# Patient Record
Sex: MALE | Race: WHITE | NOT HISPANIC OR LATINO | Employment: OTHER | URBAN - METROPOLITAN AREA
[De-identification: names, ages, dates, MRNs, and addresses within clinical notes are randomized per-mention and may not be internally consistent; named-entity substitution may affect disease eponyms.]

---

## 2018-02-07 ENCOUNTER — HISTORICAL (OUTPATIENT)
Dept: LAB | Facility: HOSPITAL | Age: 63
End: 2018-02-07

## 2018-02-07 LAB — RHEUMATOID FACT SERPL-ACNC: 10 IU/ML (ref 0–15)

## 2018-04-18 ENCOUNTER — HISTORICAL (OUTPATIENT)
Dept: ADMINISTRATIVE | Facility: HOSPITAL | Age: 63
End: 2018-04-18

## 2018-10-10 ENCOUNTER — HISTORICAL (OUTPATIENT)
Dept: ADMINISTRATIVE | Facility: HOSPITAL | Age: 63
End: 2018-10-10

## 2018-10-10 LAB
ABS NEUT (OLG): 7
ALBUMIN SERPL-MCNC: 4.6 GM/DL (ref 3.4–5)
ALBUMIN/GLOB SERPL: 2.19 {RATIO} (ref 1.5–2.5)
ALP SERPL-CCNC: 46 UNIT/L (ref 38–126)
ALT SERPL-CCNC: 23 UNIT/L (ref 7–52)
AST SERPL-CCNC: 17 UNIT/L (ref 15–37)
BILIRUB SERPL-MCNC: 0.6 MG/DL (ref 0.2–1)
BILIRUBIN DIRECT+TOT PNL SERPL-MCNC: 0.1 MG/DL (ref 0–0.5)
BILIRUBIN DIRECT+TOT PNL SERPL-MCNC: 0.5 MG/DL
BUN SERPL-MCNC: 20 MG/DL (ref 7–18)
CALCIUM SERPL-MCNC: 9.6 MG/DL (ref 8.5–10)
CHLORIDE SERPL-SCNC: 103 MMOL/L (ref 98–107)
CHOLEST SERPL-MCNC: 175 MG/DL (ref 0–200)
CHOLEST/HDLC SERPL: 4.3 {RATIO}
CO2 SERPL-SCNC: 27 MMOL/L (ref 21–32)
CREAT SERPL-MCNC: 1.08 MG/DL (ref 0.6–1.3)
ERYTHROCYTE [DISTWIDTH] IN BLOOD BY AUTOMATED COUNT: 13.5 % (ref 11.5–17)
GLOBULIN SER-MCNC: 2.1 GM/DL (ref 1.2–3)
GLUCOSE SERPL-MCNC: 116 MG/DL (ref 74–106)
HCT VFR BLD AUTO: 47 % (ref 42–52)
HDLC SERPL-MCNC: 41 MG/DL (ref 35–60)
HGB BLD-MCNC: 15.4 GM/DL (ref 14–18)
LDLC SERPL CALC-MCNC: 107 MG/DL (ref 0–129)
LYMPHOCYTES # BLD AUTO: 2.4 X10(3)/MCL (ref 0.6–3.4)
LYMPHOCYTES NFR BLD AUTO: 22.8 % (ref 13–40)
MCH RBC QN AUTO: 31.4 PG (ref 27–31.2)
MCHC RBC AUTO-ENTMCNC: 33 GM/DL (ref 32–36)
MCV RBC AUTO: 96 FL (ref 80–94)
MONOCYTES # BLD AUTO: 1.2 X10(3)/MCL (ref 0–1.8)
MONOCYTES NFR BLD AUTO: 10.9 % (ref 0.1–24)
NEUTROPHILS NFR BLD AUTO: 66.3 % (ref 47–80)
PLATELET # BLD AUTO: 234 X10(3)/MCL (ref 130–400)
PMV BLD AUTO: 8.9 FL
POTASSIUM SERPL-SCNC: 4.3 MMOL/L (ref 3.5–5.1)
PROT SERPL-MCNC: 6.7 GM/DL (ref 6.4–8.2)
RBC # BLD AUTO: 4.9 X10(6)/MCL (ref 4.7–6.1)
SODIUM SERPL-SCNC: 137 MMOL/L (ref 136–145)
TRIGL SERPL-MCNC: 119 MG/DL (ref 30–150)
VLDLC SERPL CALC-MCNC: 23.8 MG/DL
WBC # SPEC AUTO: 10.6 X10(3)/MCL (ref 4.5–11.5)

## 2018-10-15 LAB
EST. AVERAGE GLUCOSE BLD GHB EST-MCNC: 123 MG/DL
HBA1C MFR BLD: 5.9 % (ref 4.4–6.4)

## 2019-01-22 ENCOUNTER — HISTORICAL (OUTPATIENT)
Dept: ADMINISTRATIVE | Facility: HOSPITAL | Age: 64
End: 2019-01-22

## 2019-07-15 LAB
TESTOST SERPL-MCNC: 339 NG/DL (ref 300–1060)
TSH SERPL-ACNC: 0 MIU/ML (ref 0.35–4.94)

## 2019-07-17 ENCOUNTER — HISTORICAL (OUTPATIENT)
Dept: ADMINISTRATIVE | Facility: HOSPITAL | Age: 64
End: 2019-07-17

## 2019-07-17 LAB
T3FREE SERPL-MCNC: 4.15 PG/ML (ref 1.45–3.48)
T4 FREE SERPL-MCNC: 1.68 NG/DL (ref 0.76–1.46)

## 2020-11-30 ENCOUNTER — HISTORICAL (OUTPATIENT)
Dept: ADMINISTRATIVE | Facility: HOSPITAL | Age: 65
End: 2020-11-30

## 2020-11-30 LAB
ABS NEUT (OLG): 5.3 X10(3)/MCL (ref 2.1–9.2)
ALBUMIN SERPL-MCNC: 4.3 GM/DL (ref 3.4–5)
ALBUMIN/GLOB SERPL: 2.69 {RATIO} (ref 1.5–2.5)
ALP SERPL-CCNC: 73 UNIT/L (ref 38–126)
ALT SERPL-CCNC: 13 UNIT/L (ref 7–52)
AST SERPL-CCNC: 15 UNIT/L (ref 15–37)
BILIRUB SERPL-MCNC: 0.6 MG/DL (ref 0.2–1)
BILIRUBIN DIRECT+TOT PNL SERPL-MCNC: 0.1 MG/DL (ref 0–0.5)
BILIRUBIN DIRECT+TOT PNL SERPL-MCNC: 0.5 MG/DL
BUN SERPL-MCNC: 18 MG/DL (ref 7–18)
CALCIUM SERPL-MCNC: 9.8 MG/DL (ref 8.5–10.1)
CHLORIDE SERPL-SCNC: 106 MMOL/L (ref 98–107)
CHOLEST SERPL-MCNC: 164 MG/DL (ref 0–200)
CHOLEST/HDLC SERPL: 4.1 {RATIO}
CO2 SERPL-SCNC: 25 MMOL/L (ref 21–32)
CREAT SERPL-MCNC: 0.95 MG/DL (ref 0.6–1.3)
ERYTHROCYTE [DISTWIDTH] IN BLOOD BY AUTOMATED COUNT: 12.7 % (ref 11.5–17)
EST CREAT CLEARANCE SER (OHS): 84.85 ML/MIN
GLOBULIN SER-MCNC: 1.6 GM/DL (ref 1.2–3)
GLUCOSE SERPL-MCNC: 106 MG/DL (ref 74–106)
HCT VFR BLD AUTO: 44.3 % (ref 42–52)
HDLC SERPL-MCNC: 40 MG/DL (ref 35–60)
HGB BLD-MCNC: 15 GM/DL (ref 14–18)
LDLC SERPL CALC-MCNC: 103 MG/DL (ref 0–129)
LYMPHOCYTES # BLD AUTO: 2 X10(3)/MCL (ref 0.6–3.4)
LYMPHOCYTES NFR BLD AUTO: 23.9 % (ref 13–40)
MCH RBC QN AUTO: 31.3 PG (ref 27–31.2)
MCHC RBC AUTO-ENTMCNC: 34 GM/DL (ref 32–36)
MCV RBC AUTO: 92 FL (ref 80–94)
MONOCYTES # BLD AUTO: 1.2 X10(3)/MCL (ref 0.1–1.3)
MONOCYTES NFR BLD AUTO: 13.9 % (ref 0.1–24)
NEUTROPHILS NFR BLD AUTO: 62.2 % (ref 47–80)
PLATELET # BLD AUTO: 310 X10(3)/MCL (ref 130–400)
PMV BLD AUTO: 9.2 FL (ref 9.4–12.4)
POTASSIUM SERPL-SCNC: 4.3 MMOL/L (ref 3.5–5.1)
PROT SERPL-MCNC: 5.9 GM/DL (ref 6.4–8.2)
PSA SERPL-MCNC: 1.28 NG/ML (ref 0–4.5)
RBC # BLD AUTO: 4.8 X10(6)/MCL (ref 4.7–6.1)
SODIUM SERPL-SCNC: 140 MMOL/L (ref 136–145)
TRIGL SERPL-MCNC: 119 MG/DL (ref 30–150)
TSH SERPL-ACNC: 0.05 MIU/ML (ref 0.35–4.94)
VLDLC SERPL CALC-MCNC: 23.8 MG/DL
WBC # SPEC AUTO: 8.5 X10(3)/MCL (ref 4.5–11.5)

## 2020-12-02 LAB
T3FREE SERPL-MCNC: 3.72 PG/ML (ref 1.45–3.48)
T4 FREE SERPL-MCNC: 1.03 NG/DL (ref 0.76–1.46)

## 2021-11-09 ENCOUNTER — HISTORICAL (OUTPATIENT)
Dept: ADMINISTRATIVE | Facility: HOSPITAL | Age: 66
End: 2021-11-09

## 2021-12-02 ENCOUNTER — HISTORICAL (OUTPATIENT)
Dept: ADMINISTRATIVE | Facility: HOSPITAL | Age: 66
End: 2021-12-02

## 2021-12-02 LAB
ABS NEUT (OLG): 6.1 X10(3)/MCL (ref 2.1–9.2)
ALBUMIN SERPL-MCNC: 4.6 GM/DL (ref 3.4–5)
ALBUMIN/GLOB SERPL: 2.42 {RATIO} (ref 1.5–2.5)
ALP SERPL-CCNC: 59 UNIT/L (ref 38–126)
ALT SERPL-CCNC: 21 UNIT/L (ref 7–52)
AST SERPL-CCNC: 18 UNIT/L (ref 15–37)
BILIRUB SERPL-MCNC: 0.6 MG/DL (ref 0.2–1)
BILIRUBIN DIRECT+TOT PNL SERPL-MCNC: 0.2 MG/DL (ref 0–0.5)
BILIRUBIN DIRECT+TOT PNL SERPL-MCNC: 0.4 MG/DL
BUN SERPL-MCNC: 19 MG/DL (ref 7–18)
CALCIUM SERPL-MCNC: 9.7 MG/DL (ref 8.5–10.1)
CHLORIDE SERPL-SCNC: 105 MMOL/L (ref 98–107)
CHOLEST SERPL-MCNC: 174 MG/DL (ref 0–200)
CHOLEST/HDLC SERPL: 3.4 {RATIO}
CO2 SERPL-SCNC: 27 MMOL/L (ref 21–32)
CREAT SERPL-MCNC: 0.94 MG/DL (ref 0.6–1.3)
ERYTHROCYTE [DISTWIDTH] IN BLOOD BY AUTOMATED COUNT: 14.2 % (ref 11.5–17)
GLOBULIN SER-MCNC: 1.9 GM/DL (ref 1.2–3)
GLUCOSE SERPL-MCNC: 103 MG/DL (ref 74–106)
HCT VFR BLD AUTO: 43.4 % (ref 42–52)
HDLC SERPL-MCNC: 51 MG/DL (ref 35–60)
HGB BLD-MCNC: 14.1 GM/DL (ref 14–18)
LDLC SERPL CALC-MCNC: 93 MG/DL (ref 0–129)
LYMPHOCYTES # BLD AUTO: 2.2 X10(3)/MCL (ref 0.6–3.4)
LYMPHOCYTES NFR BLD AUTO: 22.2 % (ref 13–40)
MCH RBC QN AUTO: 30.5 PG (ref 27–31.2)
MCHC RBC AUTO-ENTMCNC: 32 GM/DL (ref 32–36)
MCV RBC AUTO: 94 FL (ref 80–94)
MONOCYTES # BLD AUTO: 1.6 X10(3)/MCL (ref 0.1–1.3)
MONOCYTES NFR BLD AUTO: 15.8 % (ref 0.1–24)
NEUTROPHILS NFR BLD AUTO: 62 % (ref 47–80)
PLATELET # BLD AUTO: 278 X10(3)/MCL (ref 130–400)
PMV BLD AUTO: 9 FL (ref 9.4–12.4)
POTASSIUM SERPL-SCNC: 4.4 MMOL/L (ref 3.5–5.1)
PROT SERPL-MCNC: 6.5 GM/DL (ref 6.4–8.2)
PSA SERPL-MCNC: 1.38 NG/ML (ref 0–4.5)
RBC # BLD AUTO: 4.62 X10(6)/MCL (ref 4.7–6.1)
SODIUM SERPL-SCNC: 139 MMOL/L (ref 136–145)
T4 FREE SERPL-MCNC: 0.96 NG/DL (ref 0.76–1.46)
TESTOST SERPL-MCNC: 441 NG/DL (ref 300–1060)
TRIGL SERPL-MCNC: 150 MG/DL (ref 30–150)
TSH SERPL-ACNC: 1.35 MIU/ML (ref 0.35–4.94)
VLDLC SERPL CALC-MCNC: 30 MG/DL
WBC # SPEC AUTO: 9.9 X10(3)/MCL (ref 4.5–11.5)

## 2021-12-03 LAB — GRAM STN SPEC: NORMAL

## 2021-12-04 LAB — FINAL CULTURE: NORMAL

## 2021-12-22 ENCOUNTER — HISTORICAL (OUTPATIENT)
Dept: ADMINISTRATIVE | Facility: HOSPITAL | Age: 66
End: 2021-12-22

## 2021-12-22 LAB
ABS NEUT (OLG): 7.35 X10(3)/MCL (ref 2.1–9.2)
ALBUMIN SERPL-MCNC: 3.9 GM/DL (ref 3.4–4.8)
ALBUMIN/GLOB SERPL: 1.6 RATIO (ref 1.1–2)
ALP SERPL-CCNC: 51 UNIT/L (ref 40–150)
ALT SERPL-CCNC: 31 UNIT/L (ref 0–55)
AST SERPL-CCNC: 18 UNIT/L (ref 5–34)
BASOPHILS # BLD AUTO: 0.1 X10(3)/MCL (ref 0–0.2)
BASOPHILS NFR BLD AUTO: 1 %
BILIRUB SERPL-MCNC: 0.4 MG/DL
BILIRUBIN DIRECT+TOT PNL SERPL-MCNC: 0.2 MG/DL (ref 0–0.5)
BILIRUBIN DIRECT+TOT PNL SERPL-MCNC: 0.2 MG/DL (ref 0–0.8)
BUN SERPL-MCNC: 17.4 MG/DL (ref 8.4–25.7)
CALCIUM SERPL-MCNC: 9.4 MG/DL (ref 8.7–10.5)
CHLORIDE SERPL-SCNC: 105 MMOL/L (ref 98–107)
CO2 SERPL-SCNC: 27 MMOL/L (ref 23–31)
CREAT SERPL-MCNC: 0.83 MG/DL (ref 0.73–1.18)
EOSINOPHIL # BLD AUTO: 0.3 X10(3)/MCL (ref 0–0.9)
EOSINOPHIL NFR BLD AUTO: 3 %
ERYTHROCYTE [DISTWIDTH] IN BLOOD BY AUTOMATED COUNT: 14 % (ref 11.5–17)
GLOBULIN SER-MCNC: 2.5 GM/DL (ref 2.4–3.5)
GLUCOSE SERPL-MCNC: 97 MG/DL (ref 82–115)
HCT VFR BLD AUTO: 43.9 % (ref 42–52)
HGB BLD-MCNC: 14.6 GM/DL (ref 14–18)
LYMPHOCYTES # BLD AUTO: 2.8 X10(3)/MCL (ref 0.6–4.6)
LYMPHOCYTES NFR BLD AUTO: 24 %
MCH RBC QN AUTO: 30.8 PG (ref 27–31)
MCHC RBC AUTO-ENTMCNC: 33.3 GM/DL (ref 33–36)
MCV RBC AUTO: 92.6 FL (ref 80–94)
MONOCYTES # BLD AUTO: 1 X10(3)/MCL (ref 0.1–1.3)
MONOCYTES NFR BLD AUTO: 9 %
NEUTROPHILS # BLD AUTO: 7.35 X10(3)/MCL (ref 2.1–9.2)
NEUTROPHILS NFR BLD AUTO: 63 %
PLATELET # BLD AUTO: 257 X10(3)/MCL (ref 130–400)
PMV BLD AUTO: 10 FL (ref 9.4–12.4)
POTASSIUM SERPL-SCNC: 3.8 MMOL/L (ref 3.5–5.1)
PROT SERPL-MCNC: 6.4 GM/DL (ref 5.8–7.6)
RBC # BLD AUTO: 4.74 X10(6)/MCL (ref 4.7–6.1)
SODIUM SERPL-SCNC: 139 MMOL/L (ref 136–145)
WBC # SPEC AUTO: 11.7 X10(3)/MCL (ref 4.5–11.5)

## 2022-02-17 ENCOUNTER — HISTORICAL (OUTPATIENT)
Dept: ADMINISTRATIVE | Facility: HOSPITAL | Age: 67
End: 2022-02-17

## 2022-04-10 ENCOUNTER — HISTORICAL (OUTPATIENT)
Dept: ADMINISTRATIVE | Facility: HOSPITAL | Age: 67
End: 2022-04-10

## 2022-04-26 VITALS
DIASTOLIC BLOOD PRESSURE: 86 MMHG | SYSTOLIC BLOOD PRESSURE: 134 MMHG | BODY MASS INDEX: 26.12 KG/M2 | HEIGHT: 69 IN | OXYGEN SATURATION: 98 % | WEIGHT: 176.38 LBS

## 2022-05-02 NOTE — HISTORICAL [facility] CERNER
This is a historical note converted from Ceryelena. Formatting and pictures may have been removed.  Please reference Viviane for original formatting and attached multimedia. Chief Complaint  Wellness  History of Present Illness  65 yo CM presents for a well adult exam. Pt. denies significant weight loss or weight gain, excessive fatigue, headache, hearing loss, vision changes, chest pain or SOB.?  ?   A separate evaluation and management service was performed today for??Multiple complaints including chronic sinusitis, rash, GI issues, BPH symptoms.? Patient has a history of chronic sinusitis and had?sinus surgery April of this year which did not help. He is now on allergy shots. Patient denies fever or chills, but has facial pressure?and eye swelling?with a sinus issues.?In January of this year he had?left eye pain?and was diagnosed with shingles in the eye, but?it was actually his sinuses?that were causing his eye pain and pressure.?Patient blew his nose this morning?and collected?the mucus for culture.?He?just recently finished a 12-day course of Cipro for Klebsiella infection?of his sinuses.?Patient notes he has been having?gastro issues?and was told that the infection could possibly be coming from his GI tract.? Patient is now on probiotic.??Patient is overdue for colonoscopy?and?will contact  to have an upper and lower scope.?He also has a rash that is going on right now which he gets yearly.?He did contact his dermatologist to verify that it was not?related to?new medications.?He recently started Flomax even though he?was prescribed medication?over a year ago?for?BPH symptoms.?He sees?pain management?for ablations?and had a recent?caudal?injection?with relief.?Patient is stable on his medications. Patient is still smoking?a pack a day.?  ?   PMhx: BPH, ED, HLD, IBS, Insomnia, umbilical hernia, chronic sinusitis  ?   Health status: fair  Exercise:?nothing routine  Diet:?regular  Caffeine: a lot of caffeine,  cokes and coffee  ETOH: 1-2 times a month  Tobacco use:?1 pack a day  ?   Specialists:?GI, Simba, Haney, ENT, Arias, derm.  ?   Labs: ordered today, not fasting  Immunizations: UTD with covid, Tdap, and shingles  Colon Ca Screening: Colonoscopy overdue with Dr. Cottrell  PSA: due today  Dental/Vision:?both UTD  Review of Systems  Constitutional:?No weight gain, No fever, No chills, No fatigue.?  Eyes:?No blurring, No visual disturbances.?+eyes swollen with sinus issues  Ear/Nose/Mouth/Throat:?No decreased hearing, No ear pain, +nasal congestion, No sore throat.?  Respiratory:?No shortness of breath, No cough, No wheezing.?+smoker  Cardiovascular:?No chest pain, No palpitations, No peripheral edema.?  Gastrointestinal:?No nausea, No vomiting, No diarrhea, No constipation, No abdominal pain.?+umbilical hernia  Genitourinary:?No dysuria, No hematuria.?+BPH  Hematology/Lymphatics:?No bruising tendency, No bleeding tendency, No swollen lymph glands.?  Endocrine:?No excessive thirst, No polyuria, No excessive hunger.?  Musculoskeletal:?No decreased range of motion.?+chronic back pain  Integumentary:?No rash, No pruritus.?  Neurologic:?No abnormal balance, No confusion, No headache.?+insomnia +RLS  Psychiatric:?No anxiety, No depression, Not suicidal, No hallucinations. ?  Physical Exam  Vitals & Measurements  HR:?77(Peripheral)? BP:?141/86? SpO2:?98%?  HT:?174?cm? HT:?174.00?cm? WT:?81.4?kg? WT:?81.400?kg? BMI:?26.89?  General:?Alert and oriented, No acute distress.?  Eye:?Normal conjunctiva without exudate.  HENMT:?Normocephalic/AT, Normal hearing, Oral mucosa is moist and pink. ?Sinuses nontender to palpation.? Bilateral TMs clear.? No LAD.  Neck:?No goiter visualized.??No nodules palpated. ?No LAD.  Respiratory:?Lungs CTAB?without wheezing or rhonchi, Respirations are non-labored, Breath sounds are equal, Symmetrical chest wall expansion.  Cardiovascular:?Normal rate, Regular rhythm, No murmur, No  edema.?  Gastrointestinal:?Non-distended.?+umbilical hernia reducible, soft, nontender to palpation. ?No mass.  Genitourinary:?Deferred.  Musculoskeletal:?Normal ROM, Normal gait, No deformities or amputations.  Integumentary:?Warm, Dry, Intact. No diaphoresis, or flushing.  Neurologic:?No focal deficits, Cranial Nerves II-XII are grossly intact.?  Psychiatric:?Cooperative, Appropriate mood & affect, Normal judgment, Non-suicidal.  Assessment/Plan  1.?Wellness examination?Z00.00  Diet discussed (healthy food choices, reduce portions and overall calorie intake)  Exercise 30-45 minutes 3x per week  Avoid excessive alcohol and tobacco  Immunizations discussed  Monthly testicular exam recommended  Preventative exams discussed  Labs ordered  Ordered:  Clinic Follow-Up Wellness, *Est. 12/02/22 3:00:00 CST, Order for future visit, Wellness examination, ink AFP  Medicare Annual Wellness- Subsequent  PC, Wellness examination, INK AMB - [facility], 12/02/21 11:37:00 CST  ?  2.?Hyperlipidemia?E78.5  lipid panel, CMP  Continue statin therapy  Ordered:  Comprehensive Metabolic Panel, Routine collect, 12/02/21 11:55:00 CST, Blood, Stop date 12/02/21 11:55:00 CST, Lab Collect, Fatigue  Hyperlipidemia  Medication management, 12/02/21 11:55:00 CST  Lipid Panel, Routine collect, 12/02/21 11:55:00 CST, Blood, Stop date 12/02/21 11:55:00 CST, Lab Collect, Hyperlipidemia, 12/02/21 11:55:00 CST  ?  3.?Abnormal TSH?R79.89  ?TSH, free T4  Ordered:  Free T4, Routine collect, 12/02/21 11:55:00 CST, Blood, Stop date 12/02/21 11:55:00 CST, Lab Collect, Abnormal TSH, 12/02/21 11:55:00 CST  Thyroid Stimulating Hormone, Routine collect, 12/02/21 11:55:00 CST, Blood, Stop date 12/02/21 11:55:00 CST, Lab Collect, Abnormal TSH, 12/02/21 11:55:00 CST  ?  4.?BPH (benign prostatic hyperplasia)?N40.0  cont. Flomax  PSA today  ?  5.?Hypogonadism male?E29.1  ?testosterone level  Ordered:  Testosterone Level Total, Routine collect, 12/02/21 11:55:00  CST, Blood, Stop date 12/02/21 11:55:00 CST, Lab Collect, Hypogonadism male, 12/02/21 11:55:00 CST  ?  6.?Insomnia?G47.00  ?cont. Lunesta  ?  7.?RLS (restless legs syndrome)?G25.81  cont. gabapentin  ?  8.?Chronic sinusitis?J32.9  ?per pt. request. sputum sample  Ordered:  Gram Stain, Routine collect, 12/02/21 11:41:00 CST, Order for future visit, Sputum, Stop date 12/02/21 11:41:00 CST, Chronic sinusitis  Respiratory Culture, Routine collect, 12/02/21 11:41:00 CST, Order for future visit, Sputum, Stop date 12/02/21 11:41:00 CST, Chronic sinusitis  ?  9.?COPD type A?J43.9,?COPD type A?J43.9  Rx. Trelegy daily  albuterol PRN  smoking cessation discussed  Ordered:  fluticasone/umeclidinium/vilanterol, = 1 puff(s), INH, Daily, at the same time every day, # 60 EA, 11 Refill(s), Pharmacy: [facility], 174, cm, Height/Length Dosing, 12/02/21 11:15:00 CST, 81.4, kg, Weight Dosing, 12/02/21 11:08:00 CST  ?  10.?Tobacco user?Z72.0  ?does not wish to quit at this time.  ?  11.?Need for influenza vaccination?Z23  ?Consented and given to patient in office today.  ?  12.?Prostate cancer screening?Z12.5,?Prostate cancer screening?Z12.5  ?PSA screen  Ordered:  Prostatic Specific Antigen Screening Test, Routine collect, 12/02/21 11:55:00 CST, Blood, Stop date 12/02/21 11:55:00 CST, Lab Collect, Prostate cancer screening, 12/02/21 11:55:00 CST  ?  Orders:  Automated Diff, Routine collect, 12/02/21 11:55:00 CST, Blood, Collected, Stop date 12/02/21 11:55:00 CST, Lab Collect, Fatigue, 12/02/21 11:55:00 CST  CBC w/ Auto Diff, Routine collect, 12/02/21 11:55:00 CST, Blood, Stop date 12/02/21 11:55:00 CST, Lab Collect, Fatigue, 12/02/21 11:55:00 CST  Education and counseling done face to face regarding medical conditions, current and new medications including risk/benefit and side effects/adverse events, over the counter medications-uses/doses, home self-care and red flags and indications for immediate medical attention. Call if  symptoms change or new ones develop. Should any symptoms ever significantly worsen, go to ER. Follow up as?scheduled or sooner PRN. Will call with any results. The patient is receptive, expresses understanding and is agreeable to plan. All questions have been answered.?  Referrals  Clinic Follow-Up Wellness, *Est. 12/02/22 3:00:00 CST, Order for future visit, Wellness examination, [facility]   Problem List/Past Medical History  Ongoing  Abnormal TSH  Acne  BPH (benign prostatic hyperplasia)  Bruising  Degenerative arthritis of thumb  Erectile dysfunction  Gastric ulcer  Headache  Hyperlipidemia  Hypogonadism male  IBS (irritable bowel syndrome)  Insomnia  Nocturia  RLS (restless legs syndrome)  Tobacco user  Historical  History of shingles  Plantar fasciitis  Thumb sprain  Procedure/Surgical History  Excision of Lumbar Vertebral Disc, Open Approach (07/14/2016)  Excision of Lumbosacral Disc, Open Approach (07/14/2016)  Fusion of Lumbar Vertebral Joint with Interbody Fusion Device, Posterior Approach, Anterior Column, Open Approach (07/14/2016)  Fusion of Lumbosacral Joint with Interbody Fusion Device, Posterior Approach, Anterior Column, Open Approach (07/14/2016)  Pedicle Screw Fixation (.) (07/14/2016)  Transformaminal Lumbar Interbody Fusion Minimally Invasive (.) (07/14/2016)  LESI (03/01/2016)  Colonoscopy (10/29/2014)  Bilateral inguinal hernia repair  LESLY  Cholecystectomy  Colonoscopy, flexible; with endoscopic mucosal resection  History of tonsillectomy  neck and back radiofrequency ablation  ORAL SX FROM ABSCESS WITH BONE GRAFT   Medications  acetaminophen-hydrocodone 325 mg-10 mg oral tablet, 1 tab(s), Oral, QID  atorvastatin 10 mg oral tablet, See Instructions  eszopiclone 3 mg oral tablet, 3 mg= 1 tab(s), Oral, Once a day (at bedtime), 1 refills  gabapentin 600 mg oral tablet, 600 mg= 1 tab(s), Oral, TID  methocarbamol 500 mg oral tablet, 500 mg= 1 tab(s), Oral, TID  ProAir HFA 90 mcg/inh inhalation  aerosol with adapter, 2 puff(s), INH, QID, 2 refills  sildenafil 100 mg oral tablet, See Instructions  tamsulosin 0.4 mg oral capsule, 0.4 mg= 1 cap(s), Oral, At Bedtime, 5 refills  Vitamin D3 2000 intl units oral tablet, 2000 IntUnit= 1 tab(s), Oral, Daily  Allergies  Thymersol  sulfa drugs?(Unknown)  Social History  Abuse/Neglect  No, 12/02/2021  No, 11/30/2020  Alcohol  Current, Liquor, 1-2 times per month, 04/18/2018  Employment/School  Employed, 04/18/2018  Home/Environment  Lives with Spouse., 04/18/2018  Nutrition/Health  Regular, 04/18/2018  Substance Use  Never, 06/29/2016  Tobacco  10 or more cigarettes (1/2 pack or more)/day in last 30 days, No, 12/02/2021  Smoker, current status unknown, Cigarettes, No, 11/30/2020  10 or more cigarettes (1/2 pack or more)/day in last 30 days, N/A, 03/20/2019  Current every day smoker, Cigarettes, 40 per day. 52 year(s)., 04/18/2018  Family History  Hypothyroidism: Mother and Sister.  Immunizations  Vaccine Date Status   influenza virus vaccine, inactivated 12/02/2021 Given   COVID-19 MRNA, LNP-S, PF- Pfizer 03/10/2021 Given   COVID-19 MRNA, LNP-S, PF- Pfizer 02/17/2021 Given   influenza virus vaccine, inactivated 10/19/2020 Given   influenza virus vaccine, inactivated 11/15/2019 Recorded   influenza virus vaccine, inactivated 11/18/2018 Recorded   zoster vaccine live 04/13/2016 Recorded   tetanus-diphtheria toxoids 01/26/2016 Recorded   Health Maintenance  Health Maintenance  ???Pending?(in the next year)  ??? ??OverDue  ??? ? ? ?Smoking Cessation due??01/01/21??and every 1??year(s)  ??? ? ? ?Advance Directive due??01/02/21??and every 1??year(s)  ??? ? ? ?Alcohol Misuse Screening due??01/02/21??and every 1??year(s)  ??? ? ? ?Cognitive Screening due??01/02/21??and every 1??year(s)  ??? ? ? ?Fall Risk Assessment due??01/02/21??and every 1??year(s)  ??? ? ? ?Functional Assessment due??01/02/21??and every 1??year(s)  ??? ? ? ?Medicare Annual Wellness Exam due??11/30/21??and  every 1??year(s)  ??? ??Due?  ??? ? ? ?Depression Screening due??11/30/21??and every 1??year(s)  ??? ? ? ?Diabetes Screening due??11/30/21??and every 1??year(s)  ??? ? ? ?ADL Screening due??12/02/21??and every 1??year(s)  ??? ? ? ?Abdominal Aortic Aneurysm Screening due??12/02/21??and every 100??year(s)  ??? ? ? ?Aspirin Therapy for CVD Prevention due??12/02/21??and every 1??year(s)  ??? ? ? ?Lung Cancer Screening due??12/02/21??and every 1??year(s)  ??? ? ? ?Pneumococcal Vaccine due??12/02/21??Unknown Frequency  ??? ? ? ?Zoster Vaccine due??12/02/21??Unknown Frequency  ??? ??Due In Future?  ??? ? ? ?Obesity Screening not due until??01/01/22??and every 1??year(s)  ???Satisfied?(in the past 1 year)  ??? ??Satisfied?  ??? ? ? ?Blood Pressure Screening on??12/02/21.??Satisfied by Luzma Francois CMA.  ??? ? ? ?Body Mass Index Check on??12/02/21.??Satisfied by Luzma Francois CMA.  ??? ? ? ?Influenza Vaccine on??12/02/21.??Satisfied by Luzma Francois CMA.  ??? ? ? ?Obesity Screening on??12/02/21.??Satisfied by Luzma Francois CMA.  ?      Patient condition discussed?in detail with nurse practitioner.? Agree with plan of care?and follow-up.

## 2022-05-02 NOTE — HISTORICAL [facility] CERNER
This is a historical note converted from Viviane. Formatting and pictures may have been removed.  Please reference Viviane for original formatting and attached multimedia. Chief Complaint  EVAL BODY RASH, ARTHRITIS, URINARY ISSUES STILL, DISCUSS HOROMONE ISSUE, LOWER LIBIDO  History of Present Illness  Patient presents today?with complaint of?sinus inflammation and congestion?greenish nasal discharge getting worse over the last week.  Another issue is that his dermatitis is back he gets this approximately 1 time per year?Celestone injection usually helps he also sees Dr. Arias dermatologist?for which she is making an appointment.  He also wants his testosterone checked?as he is having some difficulty with erectile dysfunction as well as?what he considers being a low libido?and?heat intolerance.? No other symptoms.  Patient also presented today with complaint of?frequent urination especially at night, but realized that he was given a prescription for?Flomax at his last office visit but has never tried it.  Review of Systems  Constitutional:?No fever, no weakness, no weight loss, no fatigue  Eye: ? ? ? ? ? ? ? ???No eye redness, drainage, or pain  ENMT:??? ? ? ? ? ?Nasal discharge, congestion,?no sore?throat pain  Respiratory:????Cough productive for greenish sputum,?no wheezing,?no shortness of breath  Cardiovascular:??No chest pain, no MCCARTNEY  Gastrointestinal:?No nausea, vomiting, or diarrhea. No abdominal pain, no hematochezia or melena  Musculoskeletal:?No muscle or joint pain, no joint swelling  Integumentary:???Red rash bilateral legs?and torso?consistent with same as gets yearly.  Neuro:??No headaches, dizziness, or weakness  Psych:?No anxiety, depression, or SI/HI  Endo:?Nocturia?no polyuria, polydipsia, or polyphagia  Heme/Lymph:??No bruising or lymphadenopathy  ?  Physical Exam  Vitals & Measurements  T:?37.2? ?C (Oral)? HR:?88(Peripheral)? BP:?136/74?  HT:?172?cm? WT:?81.1?kg? BMI:?27.41?  General: ???? ? ? ?  ? Well developed, well-nourished, in no acute distress  Eye: ? ? ? ? ? ? ? ? ? ??Clear conjunctiva, eyelids normal  HENT:??? ? ? ? ? ? ? ? Inflamed turbinates?with green discharge?through nasal passages,?mild erythema?of oropharynx with signs of postnasal drip.?? TMs clear  Neck:??? ? ? ? ? ? ? ? ?Full range of motion, No cervical lymphadenopathy  Respiratory:??? ? ?Clear to auscultation bilaterally  Cardiovascular:?Regular rate and rhythm without murmurs, gallops or rubs  Abdomen: ? ? ? ? ?Soft, NT, ND, NABS x4, no HSM  Musculoskeletal:?No?CCE  Neuro: ? ? ? ? ? ? ? ? ?No motor/sensory deficits  Integumentary: ??Diffuse erythematous rash?to bilateral?thighs and trunk.? It is not raised?no discharge, positive excoriations where patient is scratched.  LN:?WNL  Assessment/Plan  1.?Hypogonadism male?E29.1  ?Testosterone level today we will contact patient with results.  Ordered:  Office/Outpatient Visit Level 5 Established [id number] PC, Hypogonadism male  Erectile dysfunction  Dermatitis  Sinusitis  Nocturia  Tobacco user, [facility] AMB - AFP, 07/15/19 16:14:00 CDT  Testosterone Level Total, Routine collect, 07/15/19 16:12:00 CDT, Blood, Stop date 07/15/19 16:12:00 CDT, Lab Collect, Hypogonadism male  Erectile dysfunction, 07/15/19 16:12:00 CDT  Thyroid Stimulating Hormone, Routine collect, 07/15/19 16:12:00 CDT, Blood, Stop date 07/15/19 16:12:00 CDT, Lab Collect, Hypogonadism male  Erectile dysfunction, 07/15/19 16:12:00 CDT  ?  2.?Erectile dysfunction?N52.9  ?Viagra 100 mg 1/2-1 as needed  Ordered:  Office/Outpatient Visit Level 5 Established [id number] PC, Hypogonadism male  Erectile dysfunction  Dermatitis  Sinusitis  Nocturia  Tobacco user, [facility] AMB - AFP, 07/15/19 16:14:00 CDT  Testosterone Level Total, Routine collect, 07/15/19 16:12:00 CDT, Blood, Stop date 07/15/19 16:12:00 CDT, Lab Collect, Hypogonadism male  Erectile dysfunction, 07/15/19 16:12:00 CDT  Thyroid Stimulating Hormone, Routine collect, 07/15/19  16:12:00 CDT, Blood, Stop date 07/15/19 16:12:00 CDT, Lab Collect, Hypogonadism male  Erectile dysfunction, 07/15/19 16:12:00 CDT  ?  3.?Dermatitis?L30.9  ?Celestone 2 cc IM, over-the-counter?Claritin or Benadryl?as needed. ?Keep appointments with dermatology Dr. Arias.  Ordered:  Office/Outpatient Visit Level 5 Established [id number] PC, Hypogonadism male  Erectile dysfunction  Dermatitis  Sinusitis  Nocturia  Tobacco user, [facility] AMB - AFP, 07/15/19 16:14:00 CDT  ?  4.?Sinusitis?J32.9  Prescription written for Omnicef 300 mg 1 tablet twice a day  2 cc of Celestone IM today  Ordered:  Office/Outpatient Visit Level 5 Established [id number] PC, Hypogonadism male  Erectile dysfunction  Dermatitis  Sinusitis  Nocturia  Tobacco user, [facility] AMB - AFP, 07/15/19 16:14:00 CDT  ?  5.?Nocturia?R35.1  ?Patient to begin Flomax?at night?he had not taken in past.  Ordered:  Office/Outpatient Visit Level 5 Established [id number] PC, Hypogonadism male  Erectile dysfunction  Dermatitis  Sinusitis  Nocturia  Tobacco user, [facility] AMB - AFP, 07/15/19 16:14:00 CDT  ?  6.?Tobacco user?Z72.0  ?Long discussion with patient about importance of smoking cessation?as this will go a long way?to helping some of the above problems.? Patient voiced understanding but is not ready to quit  Ordered:  Office/Outpatient Visit Level 5 Established [id number] PC, Hypogonadism male  Erectile dysfunction  Dermatitis  Sinusitis  Nocturia  Tobacco user, [facility] AMB - AFP, 07/15/19 16:14:00 CDT  ?  Orders:  Clinic Follow-up PRN, 07/15/19 16:06:00 CDT, [facility] AMB - AFP, Future Order  Clinic Follow-up PRN, 07/15/19 16:15:00 CDT, [facility] AMB - AFP, Future Order  Referrals  Clinic Follow-up PRN, 07/15/19 16:06:00 CDT, [facility] AMB - AFP, Future Order  Clinic Follow-up PRN, 07/15/19 16:15:00 CDT, [facility] AMB - AFP, Future Order   Problem List/Past Medical History  Ongoing  Acne  BPH (benign prostatic hyperplasia)  Bruising  Colitis  Degenerative arthritis of  thumb  Dermatitis  Erectile dysfunction  Gastric ulcer  Headache  Hyperlipidemia  Hypogonadism male  IBS (irritable bowel syndrome)  Insomnia  Nocturia  Sinusitis  Thumb sprain  Tobacco user  Historical  Plantar fasciitis  Procedure/Surgical History  Excision of Lumbar Vertebral Disc, Open Approach (07/14/2016)  Excision of Lumbosacral Disc, Open Approach (07/14/2016)  Fusion of Lumbar Vertebral Joint with Interbody Fusion Device, Posterior Approach, Anterior Column, Open Approach (07/14/2016)  Fusion of Lumbosacral Joint with Interbody Fusion Device, Posterior Approach, Anterior Column, Open Approach (07/14/2016)  Pedicle Screw Fixation (.) (07/14/2016)  Transformaminal Lumbar Interbody Fusion Minimally Invasive (.) (07/14/2016)  LESI (03/01/2016)  Colonoscopy (10/29/2014)  Bilateral inguinal hernia repair  LESLY  Cholecystectomy  Colonoscopy, flexible; with endoscopic mucosal resection  History of tonsillectomy  neck and back radiofrequency ablation   Medications  acetaminophen-hydrocodone 325 mg-10 mg oral tablet, 1 tab(s), Oral, QID  Adapalene 0.3% Gel, 1 odalys, TOP, Once a day (at bedtime)  Atorvastatin 10 Mg Tablet, 10 mg= 1 tab(s), Oral, Daily  budesonide 9 mg oral tablet, extended release, 9 mg= 1 tab(s), Oral, Daily, 1 refills  Clindamycin Ph 1% Solution, 1 odalys, TOP, BID  CLOBETASOL CRE 0.05%, 1 odalys, TOP  DOXYCYCL HYC CAP 100MG, 100 mg= 1 cap(s), Oral, BID  eszopiclone 3 mg oral tablet, 3 mg= 1 tab(s), Oral, Once a day (at bedtime), 5 refills  gabapentin 600 mg oral tablet, 600 mg= 1 tab(s), Oral, TID  methocarbamol 500 mg oral tablet, 500 mg= 1 tab(s), Oral, TID  Pepcid 20 mg oral tablet, 20 mg= 1 tab(s), Oral, BID  ProAir HFA 90 mcg/inh inhalation aerosol with adapter, 2 puff(s), INH, QID, 2 refills  TOPICORT SPR 0.25%, 1 odalys, TOP  Vitamin D3 2000 intl units oral tablet, 2000 IntUnit= 1 tab(s), Oral, Daily  Allergies  Thymersol  sulfa drugs?(Unknown)  Social History  Abuse/Neglect  No,  07/15/2019  Alcohol  Current, Liquor, 1-2 times per month, 04/18/2018  Employment/School  Employed, 04/18/2018  Home/Environment  Lives with Spouse., 04/18/2018  Nutrition/Health  Regular, 04/18/2018  Substance Use  Never, 06/29/2016  Tobacco  Smoker, current status unknown, Cigarettes, No, 07/15/2019  10 or more cigarettes (1/2 pack or more)/day in last 30 days, N/A, 03/20/2019  Former smoker, quit more than 30 days ago, No, 01/22/2019  Current every day smoker, Cigarettes, 40 per day. 52 year(s)., 04/18/2018  Family History  Hypothyroidism: Mother and Sister.  Immunizations  Vaccine Date Status   influenza virus vaccine, inactivated 11/18/2018 Recorded   zoster vaccine live 04/13/2016 Recorded   tetanus-diphtheria toxoids 01/26/2016 Recorded   Health Maintenance  Health Maintenance  ???Pending?(in the next year)  ??? ??OverDue  ??? ? ? ?Advance Directive due??01/01/19??and every 1??year(s)  ??? ? ? ?Alcohol Misuse Screening due??01/01/19??and every 1??year(s)  ??? ? ? ?Cognitive Screening due??01/01/19??and every 1??year(s)  ??? ? ? ?Fall Risk Assessment due??01/01/19??and every 1??year(s)  ??? ? ? ?Functional Assessment due??01/01/19??and every 1??year(s)  ??? ? ? ?Geriatric Depression Screening due??01/01/19??and every 1??year(s)  ??? ? ? ?Smoking Cessation due??01/01/19??and every 1??year(s)  ??? ??Due?  ??? ? ? ?ADL Screening due??07/15/19??and every 1??year(s)  ??? ? ? ?Abdominal Aortic Aneurysm Screening due??07/15/19??and every 100??year(s)  ??? ? ? ?Aspirin Therapy for CVD Prevention due??07/15/19??and every 1??year(s)  ??? ? ? ?Lung Cancer Screening due??07/15/19??and every 1??year(s)  ??? ? ? ?Pneumococcal Vaccine due??07/15/19??Variable frequency  ??? ? ? ?Pneumococcal Vaccine due??07/15/19??and every?  ??? ??Due In Future?  ??? ? ? ?Obesity Screening not due until??01/01/20??and every 1??year(s)  ???Satisfied?(in the past 1 year)  ??? ??Satisfied?  ??? ? ? ?Blood Pressure Screening  on??07/15/19.??Satisfied by Harmony Ochoa LPN  ??? ? ? ?Body Mass Index Check on??07/15/19.??Satisfied by Harmony Ochoa LPN  ??? ? ? ?Depression Screening on??07/15/19.??Satisfied by Harmony Ochoa LPN  ??? ? ? ?Diabetes Screening on??10/15/18.??Satisfied by Ryne Bhardwaj  ??? ? ? ?Influenza Vaccine on??11/18/18.??Satisfied by Harmoyn Ochoa LPN  ??? ? ? ?Lipid Screening on??10/10/18.??Satisfied by Dirk Llanos  ??? ? ? ?Obesity Screening on??07/15/19.??Satisfied by Harmony Ochoa LPN  ?

## 2022-05-02 NOTE — HISTORICAL [facility]
This is a historical note converted from [place]. Formatting and pictures may have been removed.  Please reference Viviane for original formatting and attached multimedia. Chief Complaint  MC CPX FAST, EVAL UMBILICAL HERNIA  History of Present Illness  65 year old WM presents for annual wellness  PMH: BPH, DDD, Insomnia, Hx Sinusitis (Dr Haney)  Pain Mgmt (Dr Walker)- scheduled for LESLY on 12/7/20  ?  , Retired  Tob: 1.5ppd x 53 years, EtOH: 1-2 glasses wine/night  No exercise but active with yard work  ?  Colonoscopy (2014)- Dr Cottrell- due again last year. He needs to reschedule.  Cardio (Dr Brantley)  ENT (Dr Haney)  Pain Mgmt (Dr Walker)  Review of Systems  Constitutional:?no fever, fatigue, weakness  Eye:?no vision loss, eye redness, drainage, or pain  ENMT:?no sore throat, ear pain, sinus pain/congestion  Respiratory:?no cough, no wheezing, no shortness of breath  Cardiovascular:?no chest pain, no palpitations, no edema  Gastrointestinal:?no nausea, vomiting, or diarrhea. Complains of bulge to umbilical area x 3-4 months. Able to reduce lump. NO pain or tenderness to area. No Changes in BM/frequency  Genitourinary:?no dysuria, no urinary frequency or urgency, no hematuria  Hema/Lymph:?no abnormal bruising or bleeding  Endocrine:?no heat or cold intolerance, no excessive thirst or excessive urination  Musculoskeletal:Chronic neck and lower back pain  Integumentary:?no skin rash or abnormal lesion  Neurologic: no headache, no dizziness, no weakness or numbness  Physical Exam  Vitals & Measurements  T:?36.9? ?C (Oral)? HR:?52(Peripheral)? BP:?104/66?  HT:?172?cm? HT:?174.00?cm? WT:?79.5?kg? WT:?79.500?kg? BMI:?26.26?  General:?well-developed well-nourished in no acute distress  Eye: PERRLA, EOMI, clear conjunctiva, eyelids normal  HENT:?TMs/ear canals clear, oropharynx without erythema/exudate, oropharynx and nasal mucosal surfaces moist, no maxillary/frontal sinus tenderness to palpation  Neck: full  range of motion, no thyromegaly or lymphadenopathy  Respiratory:?clear to auscultation bilaterally  Cardiovascular:?regular rate and rhythm without murmurs, gallops or rubs  Gastrointestinal:?soft, non-tender, non-distended with normal bowel sounds, coin sized reducible umbilical hernia (non tender)  Genitourinary: no CVA tenderness to palpation  Musculoskeletal:?full range of motion of all extremities/spine without limitation or discomfort  Integumentary: no rashes or skin lesions present  Neurologic: cranial nerves intact, no signs of peripheral neurological deficit, motor/sensory function intact  Assessment/Plan  1.?Medicare annual wellness visit, subsequent?Z00.00  ?LABS: CBC, CMP, TSH, FLP, PSA  2.?BPH (benign prostatic hyperplasia)?N40.0  ?Restart Flomax 0.4mg PO q day (30,5)  ??? Had issues with orthostasis in past with Tamsulosin. TAKE at night.  ??? If develops orthostasis/dizziness by taking med at night, will d/c and try something  ??? else  3.?Hyperlipidemia?E78.5  ?Continue Atorvastatin 10mg PO q day  4.?IBS (irritable bowel syndrome)?K58.9  5.?Insomnia?G47.00  ?Continue Lunesta 3mg PO q HS PRN  6.?Chronic back pain?M54.9  ?Continue all meds  ?Keep f/u with Dr Walker (pain mgmt)  ?Due for LESLY on 12/7/20 and radiofrequency ablation lumbar spine in 2-3 weeks  7.?Umbilical hernia?K42.9  ?Small reducible umbilical hernia  ?Patient already has appt later this week with Dr Dooley  ?Advised pt to keep appt for eval  Orders:  tamsulosin, 0.4 mg = 1 cap(s), Oral, At Bedtime, # 30 cap(s), 5 Refill(s), Pharmacy: [facility], 172, cm, Height/Length Dosing, 10/19/20 8:05:00 CDT, 79.5, kg, Weight Dosing, 11/30/20 7:42:00 CST   Problem List/Past Medical History  Ongoing  Abnormal TSH  Acne  BPH (benign prostatic hyperplasia)  Bruising  Colitis  Degenerative arthritis of thumb  Dermatitis  Erectile dysfunction  Gastric ulcer  Headache  History of shingles  Hyperlipidemia  Hypogonadism male  IBS (irritable  bowel syndrome)  Insomnia  Nocturia  RLS (restless legs syndrome)  Sinusitis  Thumb sprain  Tobacco user  Historical  Plantar fasciitis  Procedure/Surgical History  Excision of Lumbar Vertebral Disc, Open Approach (07/14/2016)  Excision of Lumbosacral Disc, Open Approach (07/14/2016)  Fusion of Lumbar Vertebral Joint with Interbody Fusion Device, Posterior Approach, Anterior Column, Open Approach (07/14/2016)  Fusion of Lumbosacral Joint with Interbody Fusion Device, Posterior Approach, Anterior Column, Open Approach (07/14/2016)  Pedicle Screw Fixation (.) (07/14/2016)  Transformaminal Lumbar Interbody Fusion Minimally Invasive (.) (07/14/2016)  LESI (03/01/2016)  Colonoscopy (10/29/2014)  Bilateral inguinal hernia repair  LESLY  Cholecystectomy  Colonoscopy, flexible; with endoscopic mucosal resection  History of tonsillectomy  neck and back radiofrequency ablation  ORAL SX FROM ABSCESS WITH BONE GRAFT   Medications  acetaminophen-hydrocodone 325 mg-10 mg oral tablet, 1 tab(s), Oral, QID  Adapalene 0.3% Gel, 1 odalys, TOP, Once a day (at bedtime)  atorvastatin 10 mg oral tablet, See Instructions  atorvastatin 10 mg oral tablet, See Instructions  betamethasone dipropionate, augmented 0.05% topical lotion, 1 odalys, TOP, BID  budesonide 9 mg oral tablet, extended release, See Instructions  Clindamycin Ph 1% Solution, 1 odalys, TOP, BID  CLOBETASOL CRE 0.05%, 1 odalys, TOP  DOXYCYCL HYC CAP 100MG, 100 mg= 1 cap(s), Oral, BID  eszopiclone 3 mg oral tablet, 3 mg= 1 tab(s), Oral, Once a day (at bedtime), 1 refills  gabapentin 600 mg oral tablet, 600 mg= 1 tab(s), Oral, TID  methocarbamol 500 mg oral tablet, 500 mg= 1 tab(s), Oral, TID  Pepcid 20 mg oral tablet, 20 mg= 1 tab(s), Oral, BID  ProAir HFA 90 mcg/inh inhalation aerosol with adapter, 2 puff(s), INH, QID, 2 refills  sildenafil 100 mg oral tablet, See Instructions  tamsulosin 0.4 mg oral capsule, 0.4 mg= 1 cap(s), Oral, At Bedtime, 5 refills  tiZANidine 4 mg oral tablet, 4  mg= 1 tab(s), Oral, qPM  TOPICORT SPR 0.25%, 1 odalys, TOP  Vitamin D3 2000 intl units oral tablet, 2000 IntUnit= 1 tab(s), Oral, Daily  Allergies  Thymersol  sulfa drugs?(Unknown)  Social History  Abuse/Neglect  No, 11/30/2020  Alcohol  Current, Liquor, 1-2 times per month, 04/18/2018  Employment/School  Employed, 04/18/2018  Home/Environment  Lives with Spouse., 04/18/2018  Nutrition/Health  Regular, 04/18/2018  Substance Use  Never, 06/29/2016  Tobacco  Smoker, current status unknown, Cigarettes, No, 11/30/2020  10 or more cigarettes (1/2 pack or more)/day in last 30 days, N/A, 03/20/2019  Former smoker, quit more than 30 days ago, No, 01/22/2019  Current every day smoker, Cigarettes, 40 per day. 52 year(s)., 04/18/2018  Family History  Hypothyroidism: Mother and Sister.  Immunizations  Vaccine Date Status   influenza virus vaccine, inactivated 10/19/2020 Given   influenza virus vaccine, inactivated 11/15/2019 Recorded   influenza virus vaccine, inactivated 11/18/2018 Recorded   zoster vaccine live 04/13/2016 Recorded   tetanus-diphtheria toxoids 01/26/2016 Recorded   Health Maintenance  Health Maintenance  ???Pending?(in the next year)  ??? ??OverDue  ??? ? ? ?Diabetes Screening due??10/15/19??and every 1??year(s)  ??? ? ? ?Smoking Cessation due??01/01/20??and every 1??year(s)  ??? ? ? ?Advance Directive due??01/02/20??and every 1??year(s)  ??? ? ? ?Cognitive Screening due??01/02/20??and every 1??year(s)  ??? ? ? ?Fall Risk Assessment due??01/02/20??and every 1??year(s)  ??? ? ? ?Functional Assessment due??01/02/20??and every 1??year(s)  ??? ??Due?  ??? ? ? ?ADL Screening due??11/30/20??and every 1??year(s)  ??? ? ? ?Abdominal Aortic Aneurysm Screening due??11/30/20??and every 100??year(s)  ??? ? ? ?Aspirin Therapy for CVD Prevention due??11/30/20??and every 1??year(s)  ??? ? ? ?Lung Cancer Screening due??11/30/20??and every 1??year(s)  ??? ? ? ?Medicare Annual Wellness Exam due??11/30/20??and every 1??year(s)  ???  ? ? ?Pneumococcal Vaccine due??11/30/20??Unknown Frequency  ??? ? ? ?Zoster Vaccine due??11/30/20??Unknown Frequency  ??? ??Due In Future?  ??? ? ? ?Obesity Screening not due until??01/01/21??and every 1??year(s)  ??? ? ? ?Alcohol Misuse Screening not due until??01/02/21??and every 1??year(s)  ??? ? ? ?Influenza Vaccine not due until??10/01/21??and every 1??day(s)  ???Satisfied?(in the past 1 year)  ??? ??Satisfied?  ??? ? ? ?Alcohol Misuse Screening on??10/19/20.??Satisfied by Harmony Ochoa LPN  ??? ? ? ?Blood Pressure Screening on??11/30/20.??Satisfied by Harmony Ochoa LPN  ??? ? ? ?Body Mass Index Check on??11/30/20.??Satisfied by Harmony Ochoa LPN  ??? ? ? ?Depression Screening on??11/30/20.??Satisfied by Harmony Ochoa LPN  ??? ? ? ?Influenza Vaccine on??10/19/20.??Satisfied by Harmony Ochoa LPN  ??? ? ? ?Obesity Screening on??11/30/20.??Satisfied by Harmony Ochoa LPN  ??? ??Refused?  ??? ? ? ?Pneumococcal Vaccine on??10/19/20.??Recorded by Harmony Ochoa LPN??Reason: Patient Refuses  ?      Patient condition discussed?in detail with nurse practitioner.? Agree with plan of care?and follow-up.

## 2022-07-26 DIAGNOSIS — Z01.818 OTHER SPECIFIED PRE-OPERATIVE EXAMINATION: Primary | ICD-10-CM

## 2022-07-28 ENCOUNTER — CLINICAL SUPPORT (OUTPATIENT)
Dept: PREADMISSION TESTING | Facility: HOSPITAL | Age: 67
End: 2022-07-28
Attending: SURGERY
Payer: MEDICARE

## 2022-07-28 DIAGNOSIS — Z01.818 OTHER SPECIFIED PRE-OPERATIVE EXAMINATION: ICD-10-CM

## 2022-07-28 PROCEDURE — 93010 ELECTROCARDIOGRAM REPORT: CPT | Mod: ,,, | Performed by: INTERNAL MEDICINE

## 2022-07-28 PROCEDURE — 93005 ELECTROCARDIOGRAM TRACING: CPT

## 2022-07-28 PROCEDURE — 93010 EKG 12-LEAD: ICD-10-PCS | Mod: ,,, | Performed by: INTERNAL MEDICINE

## 2022-07-29 RX ORDER — AZELASTINE 1 MG/ML
1 SPRAY, METERED NASAL 2 TIMES DAILY
COMMUNITY

## 2022-08-02 ENCOUNTER — ANESTHESIA EVENT (OUTPATIENT)
Dept: SURGERY | Facility: HOSPITAL | Age: 67
End: 2022-08-02
Payer: MEDICARE

## 2022-08-02 RX ORDER — SODIUM CHLORIDE 0.9 % (FLUSH) 0.9 %
10 SYRINGE (ML) INJECTION
Status: CANCELLED | OUTPATIENT
Start: 2022-08-02

## 2022-08-03 ENCOUNTER — HOSPITAL ENCOUNTER (OUTPATIENT)
Facility: HOSPITAL | Age: 67
Discharge: HOME | End: 2022-08-03
Attending: SURGERY | Admitting: SURGERY
Payer: MEDICARE

## 2022-08-03 ENCOUNTER — ANESTHESIA (OUTPATIENT)
Dept: SURGERY | Facility: HOSPITAL | Age: 67
End: 2022-08-03
Payer: MEDICARE

## 2022-08-03 DIAGNOSIS — Z98.890 S/P REPAIR OF RECURRENT VENTRAL HERNIA: Primary | ICD-10-CM

## 2022-08-03 DIAGNOSIS — K43.9 VENTRAL HERNIA: ICD-10-CM

## 2022-08-03 DIAGNOSIS — Z87.19 S/P REPAIR OF RECURRENT VENTRAL HERNIA: Primary | ICD-10-CM

## 2022-08-03 PROCEDURE — 37000009 HC ANESTHESIA EA ADD 15 MINS: Performed by: SURGERY

## 2022-08-03 PROCEDURE — 51701 INSERT BLADDER CATHETER: CPT | Performed by: SURGERY

## 2022-08-03 PROCEDURE — 63600175 PHARM REV CODE 636 W HCPCS: Performed by: ANESTHESIOLOGY

## 2022-08-03 PROCEDURE — 63600175 PHARM REV CODE 636 W HCPCS: Performed by: NURSE PRACTITIONER

## 2022-08-03 PROCEDURE — C1781 MESH (IMPLANTABLE): HCPCS | Performed by: SURGERY

## 2022-08-03 PROCEDURE — 71000033 HC RECOVERY, INTIAL HOUR: Performed by: SURGERY

## 2022-08-03 PROCEDURE — 25000003 PHARM REV CODE 250: Performed by: NURSE ANESTHETIST, CERTIFIED REGISTERED

## 2022-08-03 PROCEDURE — 25000003 PHARM REV CODE 250: Performed by: SURGERY

## 2022-08-03 PROCEDURE — 36000709 HC OR TIME LEV III EA ADD 15 MIN: Performed by: SURGERY

## 2022-08-03 PROCEDURE — 51798 US URINE CAPACITY MEASURE: CPT | Performed by: SURGERY

## 2022-08-03 PROCEDURE — 36000708 HC OR TIME LEV III 1ST 15 MIN: Performed by: SURGERY

## 2022-08-03 PROCEDURE — 63600175 PHARM REV CODE 636 W HCPCS: Performed by: NURSE ANESTHETIST, CERTIFIED REGISTERED

## 2022-08-03 PROCEDURE — 37000008 HC ANESTHESIA 1ST 15 MINUTES: Performed by: SURGERY

## 2022-08-03 PROCEDURE — 71000016 HC POSTOP RECOV ADDL HR: Performed by: SURGERY

## 2022-08-03 PROCEDURE — 25000003 PHARM REV CODE 250: Performed by: NURSE PRACTITIONER

## 2022-08-03 PROCEDURE — 27201423 OPTIME MED/SURG SUP & DEVICES STERILE SUPPLY: Performed by: SURGERY

## 2022-08-03 PROCEDURE — 71000015 HC POSTOP RECOV 1ST HR: Performed by: SURGERY

## 2022-08-03 PROCEDURE — 25000003 PHARM REV CODE 250

## 2022-08-03 DEVICE — MESH PARIETEX ROUND 9CM: Type: IMPLANTABLE DEVICE | Site: ABDOMEN | Status: FUNCTIONAL

## 2022-08-03 RX ORDER — ONDANSETRON 2 MG/ML
4 INJECTION INTRAMUSCULAR; INTRAVENOUS ONCE
Status: DISCONTINUED | OUTPATIENT
Start: 2022-08-03 | End: 2022-08-03 | Stop reason: HOSPADM

## 2022-08-03 RX ORDER — EPHEDRINE SULFATE 50 MG/ML
INJECTION, SOLUTION INTRAVENOUS
Status: DISCONTINUED | OUTPATIENT
Start: 2022-08-03 | End: 2022-08-03

## 2022-08-03 RX ORDER — CELECOXIB 200 MG/1
200 CAPSULE ORAL
Status: COMPLETED | OUTPATIENT
Start: 2022-08-03 | End: 2022-08-03

## 2022-08-03 RX ORDER — TRAMADOL HYDROCHLORIDE 50 MG/1
50 TABLET ORAL EVERY 4 HOURS PRN
Status: DISCONTINUED | OUTPATIENT
Start: 2022-08-03 | End: 2022-08-03 | Stop reason: HOSPADM

## 2022-08-03 RX ORDER — SODIUM CHLORIDE, SODIUM LACTATE, POTASSIUM CHLORIDE, CALCIUM CHLORIDE 600; 310; 30; 20 MG/100ML; MG/100ML; MG/100ML; MG/100ML
INJECTION, SOLUTION INTRAVENOUS CONTINUOUS
Status: DISCONTINUED | OUTPATIENT
Start: 2022-08-03 | End: 2022-08-03 | Stop reason: HOSPADM

## 2022-08-03 RX ORDER — DEXAMETHASONE SODIUM PHOSPHATE 4 MG/ML
INJECTION, SOLUTION INTRA-ARTICULAR; INTRALESIONAL; INTRAMUSCULAR; INTRAVENOUS; SOFT TISSUE
Status: DISCONTINUED | OUTPATIENT
Start: 2022-08-03 | End: 2022-08-03

## 2022-08-03 RX ORDER — FENTANYL CITRATE 50 UG/ML
INJECTION, SOLUTION INTRAMUSCULAR; INTRAVENOUS
Status: DISCONTINUED | OUTPATIENT
Start: 2022-08-03 | End: 2022-08-03

## 2022-08-03 RX ORDER — ROCURONIUM BROMIDE 10 MG/ML
INJECTION, SOLUTION INTRAVENOUS
Status: DISCONTINUED | OUTPATIENT
Start: 2022-08-03 | End: 2022-08-03

## 2022-08-03 RX ORDER — TAMSULOSIN HYDROCHLORIDE 0.4 MG/1
CAPSULE ORAL
Status: COMPLETED
Start: 2022-08-03 | End: 2022-08-03

## 2022-08-03 RX ORDER — SODIUM CHLORIDE, SODIUM GLUCONATE, SODIUM ACETATE, POTASSIUM CHLORIDE AND MAGNESIUM CHLORIDE 30; 37; 368; 526; 502 MG/100ML; MG/100ML; MG/100ML; MG/100ML; MG/100ML
1000 INJECTION, SOLUTION INTRAVENOUS CONTINUOUS
Status: DISCONTINUED | OUTPATIENT
Start: 2022-08-03 | End: 2022-08-03 | Stop reason: HOSPADM

## 2022-08-03 RX ORDER — MIDAZOLAM HYDROCHLORIDE 1 MG/ML
2 INJECTION INTRAMUSCULAR; INTRAVENOUS ONCE AS NEEDED
Status: COMPLETED | OUTPATIENT
Start: 2022-08-03 | End: 2022-08-03

## 2022-08-03 RX ORDER — BUPIVACAINE HCL/EPINEPHRINE 0.25-.0005
VIAL (ML) INJECTION
Status: DISCONTINUED
Start: 2022-08-03 | End: 2022-08-03 | Stop reason: HOSPADM

## 2022-08-03 RX ORDER — PROMETHAZINE HYDROCHLORIDE 25 MG/ML
12.5 INJECTION, SOLUTION INTRAMUSCULAR; INTRAVENOUS EVERY 6 HOURS PRN
Status: DISCONTINUED | OUTPATIENT
Start: 2022-08-03 | End: 2022-08-03 | Stop reason: HOSPADM

## 2022-08-03 RX ORDER — HYDROCODONE BITARTRATE AND ACETAMINOPHEN 7.5; 325 MG/1; MG/1
1 TABLET ORAL EVERY 6 HOURS PRN
Qty: 12 TABLET | Refills: 0 | Status: SHIPPED | OUTPATIENT
Start: 2022-08-03

## 2022-08-03 RX ORDER — CEFAZOLIN SODIUM 1 G/3ML
INJECTION, POWDER, FOR SOLUTION INTRAMUSCULAR; INTRAVENOUS
Status: COMPLETED
Start: 2022-08-03 | End: 2022-08-03

## 2022-08-03 RX ORDER — ENOXAPARIN SODIUM 100 MG/ML
40 INJECTION SUBCUTANEOUS
Status: COMPLETED | OUTPATIENT
Start: 2022-08-03 | End: 2022-08-03

## 2022-08-03 RX ORDER — MEPERIDINE HYDROCHLORIDE 25 MG/ML
50 INJECTION INTRAMUSCULAR; INTRAVENOUS; SUBCUTANEOUS ONCE AS NEEDED
Status: DISCONTINUED | OUTPATIENT
Start: 2022-08-03 | End: 2022-08-03 | Stop reason: HOSPADM

## 2022-08-03 RX ORDER — HYDROMORPHONE HYDROCHLORIDE 2 MG/ML
0.4 INJECTION, SOLUTION INTRAMUSCULAR; INTRAVENOUS; SUBCUTANEOUS EVERY 5 MIN PRN
Status: DISCONTINUED | OUTPATIENT
Start: 2022-08-03 | End: 2022-08-03 | Stop reason: HOSPADM

## 2022-08-03 RX ORDER — GABAPENTIN 300 MG/1
300 CAPSULE ORAL
Status: COMPLETED | OUTPATIENT
Start: 2022-08-03 | End: 2022-08-03

## 2022-08-03 RX ORDER — MEPERIDINE HYDROCHLORIDE 25 MG/ML
12.5 INJECTION INTRAMUSCULAR; INTRAVENOUS; SUBCUTANEOUS ONCE
Status: DISCONTINUED | OUTPATIENT
Start: 2022-08-03 | End: 2022-08-03 | Stop reason: HOSPADM

## 2022-08-03 RX ORDER — ONDANSETRON 2 MG/ML
4 INJECTION INTRAMUSCULAR; INTRAVENOUS EVERY 6 HOURS PRN
Status: DISCONTINUED | OUTPATIENT
Start: 2022-08-03 | End: 2022-08-03 | Stop reason: HOSPADM

## 2022-08-03 RX ORDER — TAMSULOSIN HYDROCHLORIDE 0.4 MG/1
0.4 CAPSULE ORAL ONCE
Status: COMPLETED | OUTPATIENT
Start: 2022-08-03 | End: 2022-08-03

## 2022-08-03 RX ORDER — HYDROCODONE BITARTRATE AND ACETAMINOPHEN 5; 325 MG/1; MG/1
1 TABLET ORAL EVERY 6 HOURS PRN
Status: DISCONTINUED | OUTPATIENT
Start: 2022-08-03 | End: 2022-08-03

## 2022-08-03 RX ORDER — LIDOCAINE HYDROCHLORIDE 10 MG/ML
INJECTION, SOLUTION EPIDURAL; INFILTRATION; INTRACAUDAL; PERINEURAL
Status: DISCONTINUED | OUTPATIENT
Start: 2022-08-03 | End: 2022-08-03

## 2022-08-03 RX ORDER — ONDANSETRON 4 MG/1
4 TABLET, ORALLY DISINTEGRATING ORAL
Status: COMPLETED | OUTPATIENT
Start: 2022-08-03 | End: 2022-08-03

## 2022-08-03 RX ORDER — BUPIVACAINE HYDROCHLORIDE AND EPINEPHRINE 2.5; 5 MG/ML; UG/ML
INJECTION, SOLUTION EPIDURAL; INFILTRATION; INTRACAUDAL; PERINEURAL
Status: DISCONTINUED | OUTPATIENT
Start: 2022-08-03 | End: 2022-08-03 | Stop reason: HOSPADM

## 2022-08-03 RX ORDER — ONDANSETRON HYDROCHLORIDE 2 MG/ML
INJECTION, SOLUTION INTRAMUSCULAR; INTRAVENOUS
Status: DISCONTINUED | OUTPATIENT
Start: 2022-08-03 | End: 2022-08-03

## 2022-08-03 RX ORDER — CEFAZOLIN SODIUM 1 G/3ML
INJECTION, POWDER, FOR SOLUTION INTRAMUSCULAR; INTRAVENOUS
Status: DISCONTINUED | OUTPATIENT
Start: 2022-08-03 | End: 2022-08-03

## 2022-08-03 RX ORDER — HYDROCODONE BITARTRATE AND ACETAMINOPHEN 7.5; 325 MG/1; MG/1
1 TABLET ORAL EVERY 6 HOURS PRN
Status: DISCONTINUED | OUTPATIENT
Start: 2022-08-03 | End: 2022-08-03 | Stop reason: HOSPADM

## 2022-08-03 RX ORDER — GLYCOPYRROLATE 0.2 MG/ML
INJECTION INTRAMUSCULAR; INTRAVENOUS
Status: DISCONTINUED | OUTPATIENT
Start: 2022-08-03 | End: 2022-08-03

## 2022-08-03 RX ORDER — PROCHLORPERAZINE EDISYLATE 5 MG/ML
5 INJECTION INTRAMUSCULAR; INTRAVENOUS EVERY 6 HOURS PRN
Status: DISCONTINUED | OUTPATIENT
Start: 2022-08-03 | End: 2022-08-03 | Stop reason: HOSPADM

## 2022-08-03 RX ORDER — ACETAMINOPHEN 500 MG
1000 TABLET ORAL
Status: COMPLETED | OUTPATIENT
Start: 2022-08-03 | End: 2022-08-03

## 2022-08-03 RX ORDER — PROPOFOL 10 MG/ML
VIAL (ML) INTRAVENOUS
Status: DISCONTINUED | OUTPATIENT
Start: 2022-08-03 | End: 2022-08-03

## 2022-08-03 RX ORDER — MORPHINE SULFATE 4 MG/ML
1 INJECTION, SOLUTION INTRAMUSCULAR; INTRAVENOUS
Status: DISCONTINUED | OUTPATIENT
Start: 2022-08-03 | End: 2022-08-03 | Stop reason: HOSPADM

## 2022-08-03 RX ADMIN — TAMSULOSIN HYDROCHLORIDE 0.4 MG: 0.4 CAPSULE ORAL at 05:08

## 2022-08-03 RX ADMIN — PROPOFOL 150 MG: 10 INJECTION, EMULSION INTRAVENOUS at 10:08

## 2022-08-03 RX ADMIN — CEFAZOLIN 1 G: 330 INJECTION, POWDER, FOR SOLUTION INTRAMUSCULAR; INTRAVENOUS at 10:08

## 2022-08-03 RX ADMIN — HYDROCODONE BITARTRATE AND ACETAMINOPHEN 1 TABLET: 7.5; 325 TABLET ORAL at 01:08

## 2022-08-03 RX ADMIN — ENOXAPARIN SODIUM 40 MG: 40 INJECTION SUBCUTANEOUS at 10:08

## 2022-08-03 RX ADMIN — GLYCOPYRROLATE 0.2 MG: 0.2 INJECTION INTRAMUSCULAR; INTRAVENOUS at 11:08

## 2022-08-03 RX ADMIN — GABAPENTIN 300 MG: 300 CAPSULE ORAL at 10:08

## 2022-08-03 RX ADMIN — LIDOCAINE HYDROCHLORIDE 40 MG: 10 INJECTION, SOLUTION EPIDURAL; INFILTRATION; INTRACAUDAL; PERINEURAL at 10:08

## 2022-08-03 RX ADMIN — EPHEDRINE SULFATE 10 MG: 50 INJECTION INTRAVENOUS at 11:08

## 2022-08-03 RX ADMIN — CELECOXIB 200 MG: 200 CAPSULE ORAL at 10:08

## 2022-08-03 RX ADMIN — ONDANSETRON 4 MG: 2 INJECTION INTRAMUSCULAR; INTRAVENOUS at 11:08

## 2022-08-03 RX ADMIN — FENTANYL CITRATE 50 MCG: 50 INJECTION, SOLUTION INTRAMUSCULAR; INTRAVENOUS at 11:08

## 2022-08-03 RX ADMIN — SODIUM CHLORIDE, POTASSIUM CHLORIDE, SODIUM LACTATE AND CALCIUM CHLORIDE: 600; 310; 30; 20 INJECTION, SOLUTION INTRAVENOUS at 10:08

## 2022-08-03 RX ADMIN — ROCURONIUM BROMIDE 40 MG: 10 SOLUTION INTRAVENOUS at 10:08

## 2022-08-03 RX ADMIN — MIDAZOLAM HYDROCHLORIDE 2 MG: 1 INJECTION, SOLUTION INTRAMUSCULAR; INTRAVENOUS at 10:08

## 2022-08-03 RX ADMIN — ACETAMINOPHEN 1000 MG: 500 TABLET, FILM COATED ORAL at 10:08

## 2022-08-03 RX ADMIN — FENTANYL CITRATE 50 MCG: 50 INJECTION, SOLUTION INTRAMUSCULAR; INTRAVENOUS at 10:08

## 2022-08-03 RX ADMIN — ONDANSETRON 4 MG: 4 TABLET, ORALLY DISINTEGRATING ORAL at 10:08

## 2022-08-03 RX ADMIN — DEXAMETHASONE SODIUM PHOSPHATE 4 MG: 4 INJECTION, SOLUTION INTRA-ARTICULAR; INTRALESIONAL; INTRAMUSCULAR; INTRAVENOUS; SOFT TISSUE at 11:08

## 2022-08-03 RX ADMIN — SODIUM CHLORIDE, POTASSIUM CHLORIDE, SODIUM LACTATE AND CALCIUM CHLORIDE: 600; 310; 30; 20 INJECTION, SOLUTION INTRAVENOUS at 11:08

## 2022-08-03 NOTE — ANESTHESIA POSTPROCEDURE EVALUATION
Anesthesia Post Evaluation    Patient: Duane Lagos    Procedure(s) Performed: Procedure(s) (LRB):  REPAIR, HERNIA, VENTRAL, LAPAROSCOPIC (N/A)    Final Anesthesia Type: general      Patient location during evaluation: PACU  Patient participation: Yes- Able to Participate  Level of consciousness: awake  Post-procedure vital signs: reviewed and stable  Pain management: adequate  Airway patency: patent    PONV status at discharge: vomiting (controlled) and nausea (controlled)  Anesthetic complications: no      Cardiovascular status: hemodynamically stable  Respiratory status: spontaneous ventilation and unassisted  Hydration status: euvolemic  Follow-up not needed.  Comments:              Vitals Value Taken Time   /58 08/03/22 1251   Temp 36.4 °C (97.6 °F) 08/03/22 1259   Pulse 64 08/03/22 1259   Resp 12 08/03/22 1252   SpO2 94 % 08/03/22 1259   Vitals shown include unvalidated device data.      Event Time   Out of Recovery 12:55:00         Pain/Luis Score: Pain Rating Prior to Med Admin: 0 (8/3/2022 10:15 AM)  Luis Score: 9 (8/3/2022 12:56 PM)

## 2022-08-03 NOTE — BRIEF OP NOTE
[facility] - Periop Services  Brief Operative Note    Surgery Date: 8/3/2022     Surgeon(s) and Role:     * Jeff Dooley MD - Primary    Assisting Surgeon: DAPHNE Banegas NP    Pre-op Diagnosis:  Ventral Hernia     Post-op Diagnosis:  Post-Op Diagnosis Codes:     * Ventral hernia without obstruction or gangrene [K43.9]    Procedure(s) (LRB):  REPAIR, HERNIA, VENTRAL, LAPAROSCOPIC (N/A)    Anesthesia: General    Operative Findings: dictated per attending surgeon    Estimated Blood Loss: 10 cc         Specimens:   Specimen (24h ago, onward)            None            Discharge Note    OUTCOME: transferred from PACU. Once DC criteria met, plan to DC to home.     DISPOSITION: Home or Self Care    FINAL DIAGNOSIS:  Ventral hernia without obstruction or gangrene    FOLLOWUP: In clinic    DISCHARGE INSTRUCTIONS: Discharge instructions given to patient.

## 2022-08-03 NOTE — ANESTHESIA PROCEDURE NOTES
Intubation    Date/Time: 8/3/2022 10:56 AM  Performed by: Yadi Olivas CRNA  Authorized by: Vernon Gutierrez MD     Intubation:     Induction:  Intravenous    Intubated:  Postinduction    Mask Ventilation:  Easy mask    Attempts:  1    Attempted By:  CRNA    Method of Intubation:  Direct    Blade:  Garcia 2    Laryngeal View Grade: Grade I - full view of cords      Difficult Airway Encountered?: No      Complications:  None    Airway Device:  Oral endotracheal tube    Airway Device Size:  8.0    Style/Cuff Inflation:  Cuffed (inflated to minimal occlusive pressure)    Tube secured:  23    Secured at:  The lips    Placement Verified By:  Capnometry    Complicating Factors:  None    Findings Post-Intubation:  BS equal bilateral and atraumatic/condition of teeth unchanged

## 2022-08-03 NOTE — DISCHARGE INSTRUCTIONS
Dr. Dooley's Post Operative Abdominal Hernia Repair Discharge Instructions:    - No heavy lifting (anything over 10 pounds) or straining for 4 weeks post op.   - No driving for 3 days or as long as taking pain medication.  - Wear abdominal binder as needed for post op comfort.     - Keep surgical dressing clean and dry for 48 hours post op, then ok to remove gauze surgical dressing and shower.  - Wash incision daily with antibacterial soap and water. Pat dry.   - Do not remove steri strips for 5-7 days post op. After post op day 7, ok to remove steri strips once edges of steri strips begin to curl. Do not keep steri strips in place longer than 10 days after surgery.     - Call Dr. Dooley's office with any questions or concerns regarding wound care/incisions. [phone].      Abdominal Hernia/Ventral Hernia/ Umbilical Hernia    A ventral hernia is a bulge of tissue from inside the abdomen that pushes through a weak area of the muscles that form the front wall of the abdomen. The tissues inside the abdomen are inside a sac (peritoneum). These tissues include the small intestine, large intestine, and the fatty tissue that covers the intestines (omentum). Sometimes, the bulge that forms a hernia contains intestines. Other hernias contain only fat. Ventral hernias do not go away without surgical treatment.  There are several types of ventral hernias. You may have:   A hernia at an incision site from previous abdominal surgery (incisional hernia).   A hernia just above the belly button (epigastric hernia), or at the belly button (umbilical hernia). These types of hernias can develop from heavy lifting or straining.   A hernia that comes and goes (reducible hernia). It may be visible only when you lift or strain. This type of hernia can be pushed back into the abdomen (reduced).   A hernia that traps abdominal tissue inside the hernia (incarcerated hernia). This type of hernia does not reduce.   A hernia that cuts  off blood flow to the tissues inside the hernia (strangulated hernia). The tissues can start to die if this happens. This is a very painful bulge that cannot be reduced.   A strangulated hernia is a medical emergency.  What are the causes?  This condition is caused by abdominal tissue putting pressure on an area of weakness in the abdominal muscles.  What increases the risk?  The following factors may make you more likely to develop this condition:   Being age 60 or older.   Being overweight or obese.   Having had previous abdominal surgery, especially if there was an infection after surgery.   Having had an injury to the abdominal wall.   Frequently lifting or pushing heavy objects.   Having had several pregnancies.   Having a buildup of fluid inside the abdomen (ascites).   Straining to have a bowel movement or to urinate.   Having frequent coughing episodes.  What are the signs or symptoms?  The only symptom of a ventral hernia may be a painless bulge in the abdomen. A reducible hernia may be visible only when you strain, cough, or lift. Other symptoms may include:   Dull pain.   A feeling of pressure.  Signs and symptoms of a strangulated hernia may include:   Increasing pain.   Nausea and vomiting.   Pain when pressing on the hernia.   The skin over the hernia turning red or purple.   Constipation.   Blood in the stool (feces).  How is this treated?  This condition is treated with surgery. If you have a strangulated hernia, surgery is done as soon as possible. If your hernia is small and not incarcerated, you may be asked to lose some weight before surgery.    Document Revised: 08/06/2021 Document Reviewed: 08/06/2021  [employer] Patient Education © 2021 [employer]    Laparoscopic Abdomina/Ventral Hernia Repair, Care After  This sheet gives you information about how to care for yourself after your procedure. Your health care provider may also give you more specific instructions. If you have problems or  questions, contact your health care provider.  What can I expect after the procedure?  After the procedure, it is common to have:   Pain, discomfort, or soreness.  Follow these instructions at home:  Medicines   Take over-the-counter and prescription medicines only as told by your health care provider.   Ask your health care provider if the medicine prescribed to you:  ? Requires you to avoid driving or using machinery.  ? Can cause constipation. You may need to take these actions to prevent or treat constipation:  ? Take over-the-counter or prescription medicines.  ? Eat foods that are high in fiber, such as beans, whole grains, and fresh fruits and vegetables.  ? Limit foods that are high in fat and processed sugars, such as fried or sweet foods.  Incision care     Follow instructions from your health care provider about how to take care of your incisions. Make sure you:  ? Wash your hands with soap and water for at least 20 seconds before and after you change your bandage (dressing) or before you touch your abdomen. If soap and water are not available, use hand .  ? Change your dressing as told by your health care provider.     Check your incision areas every day for signs of infection. Check for:  ? More redness, swelling, or pain.  ? Fluid or blood.  ? Warmth.  ? Pus or a bad smell.  Bathing     Do not take baths, swim, or use a hot tub until cleared by your surgeon. Ok to shower.    Keep your dressing dry until your health care provider says it can be removed.  Activity     Rest as told by your health care provider.   Avoid sitting for a long time without moving. Get up to take short walks every 1-2 hours. This is important to improve blood flow and breathing. Ask for help if you feel weak or unsteady.   Do not lift anything that is heavier than 10 lb (4.5 kg), or the limit that you are told, until your health care provider says that it is safe.   If you were given a sedative during the procedure, it  can affect you for several hours. Do not drive or operate machinery until your health care provider says that it is safe.   Return to your normal activities as told by your health care provider. Ask your health care provider what activities are safe for you.  General instructions     Drink enough fluid to keep your urine pale yellow.   Hold a pillow over your abdomen when you cough or sneeze. This helps with pain.   Do not use any products that contain nicotine or tobacco, such as cigarettes, e-cigarettes, and chewing tobacco. These can delay incision healing after surgery. If you need help quitting, ask your health care provider.   You may be asked to continue to do deep breathing exercises at home. This will help to prevent a lung infection.   Keep all follow-up visits as told by your health care provider. This is important.  Contact a health care provider if:   You have any of these signs of infection:  ? More redness, swelling, or pain around an incision.  ? Fluid or blood coming from an incision.  ? Warmth coming from an incision.  ? Pus or a bad smell coming from an incision.  ? A fever or chills.   You have pain that gets worse or does not get better with medicine.   You have nausea or vomiting.   You have a cough.   You have shortness of breath.   You have not had a bowel movement in 3 days.   You are not able to urinate.  Get help right away if you have:   Severe pain in your abdomen.   Persistent nausea and vomiting.   Redness, warmth, or pain in your leg.   Chest pain.   Trouble breathing.  These symptoms may represent a serious problem that is an emergency. Do not wait to see if the symptoms will go away. Get medical help right away. Call your local emergency services (911 in the [place] Do not drive yourself to the hospital.  Summary   After this procedure, it is common to have pain, discomfort, or soreness.   Follow instructions from your health care provider about how to take care of your  incision.   Check your incision area every day for signs of infection. Report any signs of infection to your health care provider.   Keep all follow-up visits as told by your health care provider. This is important.  This information is not intended to replace advice given to you by your health care provider. Make sure you discuss any questions you have with your health care provider.  Document Revised: 12/02/2020 Document Reviewed: 12/03/2020  [employer] Patient Education © 2021 [employer]    How to Prevent Constipation After Surgery  Constipation is a common problem after surgery. Many things can make constipation more likely after a surgery, including:   Certain medicines, especially numbing medicines (anesthetics) and very strong pain medicines called opioids.   Feeling stressed because of the surgery.   Eating different foods than normal.   Being less active.  Symptoms of constipation include:   Having fewer than three bowel movements a week.   Straining to have a bowel movement.   Having hard, dry, or larger-than-normal stools (feces).   Discomfort in the lower abdomen, such as cramps or bloating.   Not feeling relief after having a bowel movement.   Nausea and vomiting.  You can take steps to help prevent constipation after surgery.  Follow these instructions at home:  Eating and drinking     Eat foods that have a lot of fiber in them, such as beans, bran, whole grains, and fresh fruits and vegetables.   Limit foods that are high in fat and processed sugars, such as fried or sweet foods. These include french fries, hamburgers, cookies, and candy.   Take a fiber supplement as told by your health care provider. If you are not taking a fiber supplement and you think you are not getting enough fiber from foods, talk to your health care provider about adding a fiber supplement to your diet.   Drink enough fluid to keep your urine pale yellow.   Drink clear fluids, especially water. Avoid drinking alcohol,  caffeine, and soda. These can make constipation worse.  Activity     After surgery, return to your normal activities slowly, or when your health care provider says it is okay.   Start walking as soon as you can. Try to go a little farther each day.   Once your health care provider approves, do some sort of regular exercise. This helps prevent constipation.  Bowel movements   Go to the restroom when you have the urge to go. Do not hold it in.   Try drinking something hot to get a bowel movement started.   Keep track of how often you use the restroom.  Medicines   Take over-the-counter and prescription medicines only as told by your health care provider.   Talk to your health care provider about medicines that may help prevent constipation, particularly if you have a history of constipation. Your health care provider may suggest a stool softener, laxative, or fiber supplement.   Do not take any medicines without talking to your health care provider first.  Contact a health care provider if:   You used stool softeners or laxatives and still have not had a bowel movement within 24-48 hours after using them.   You have not had a bowel movement in 3 days.   You have a fever.  Get help right away if you have:   Constipation that lasts for more than 4 days or if your symptoms get worse.   Bright red blood in your stool.   Pain in the abdomen or rectum.   Very bad cramping.   Thin, pencil-like stools.   Unexplained weight loss.  Summary   Constipation is a common problem after surgery. Many things can make constipation more likely after a surgery, including certain medicines, eating different foods than normal, and being less active.   Symptoms of constipation include having fewer than three bowel movements a week, straining to have a bowel movement, and cramps or bloating in the lower abdomen.   To help prevent constipation, you should eat foods that are high in fiber, drink plenty of fluids, and get regular physical  activity.   Your health care provider may suggest medicines, such as stool softeners or laxatives, to help prevent constipation.  This information is not intended to replace advice given to you by your health care provider. Make sure you discuss any questions you have with your health care provider.    Document Revised: 11/04/2020 Document Reviewed: 11/04/2020  [employer] Patient Education © 2021 [employer]

## 2022-08-03 NOTE — TRANSFER OF CARE
"Anesthesia Transfer of Care Note    Patient: Duane Lagos    Procedure(s) Performed: Procedure(s) (LRB):  REPAIR, HERNIA, VENTRAL, LAPAROSCOPIC (N/A)    Patient location: PACU    Anesthesia Type: general    Transport from OR: Transported from OR on room air with adequate spontaneous ventilation    Post pain: adequate analgesia    Post assessment: no apparent anesthetic complications    Post vital signs: stable    Level of consciousness: sedated    Nausea/Vomiting: no nausea/vomiting    Complications: none    Transfer of care protocol was followed      Last vitals:   Visit Vitals  /72   Pulse 93   Temp 36.4 °C (97.5 °F)   Resp 20   Ht 5' 9" (1.753 m)   Wt 78.5 kg (173 lb 1 oz)   SpO2 (!) 93%   BMI 25.56 kg/m²     "

## 2022-08-03 NOTE — ANESTHESIA PREPROCEDURE EVALUATION
"                                                                                                             08/03/2022  Duane Lagos is a 67 y.o., male   Pre-operative evaluation for Procedure(s) (LRB):  REPAIR, HERNIA, VENTRAL, LAPAROSCOPIC (N/A)    Ht 5' 9" (1.753 m)   Wt 78.5 kg (173 lb 1 oz)   BMI 25.56 kg/m²     There is no problem list on file for this patient.      Review of patient's allergies indicates:   Allergen Reactions    Sulfa (sulfonamide antibiotics) Hives       Current Outpatient Medications   Medication Instructions    atorvastatin (LIPITOR) 100 mg, Oral, Daily    azelastine (ASTELIN) 137 mcg (0.1 %) nasal spray 1 spray, Nasal, 2 times daily    clindamycin (CLEOCIN T) 1 % external solution 1 application, Topical (Top), 2 times daily    doxycycline (VIBRAMYCIN) 100 mg, Oral, 2 times daily    eszopiclone (LUNESTA) 3 mg, Oral, Nightly    gabapentin (NEURONTIN) 300 mg, Oral, Daily    HYDROcodone-acetaminophen (NORCO)  mg per tablet 1 tablet, Oral, Daily PRN    levoFLOXacin (LEVAQUIN) 750 mg, Oral, Daily    methocarbamoL (ROBAXIN) 500 mg, Oral, Daily    sarecycline 100 mg, Oral, Daily    sildenafiL (VIAGRA) 100 mg, Oral, Daily    tamsulosin (FLOMAX) 0.4 mg Cap 1 capsule, Oral, Nightly    TRELEGY ELLIPTA 100-62.5-25 mcg DsDv 1 puff, Inhalation, Daily    triamcinolone acetonide 0.1% (KENALOG) 1 g, Topical (Top), 2 times daily    XHANCE 93 mcg/actuation AerB 1 spray, Each Nostril, 2 times daily       Past Surgical History:   Procedure Laterality Date    BACK SURGERY      GALLBLADDER SURGERY      HERNIA REPAIR      x2    SINUS SURGERY      TONSILLECTOMY         Social History     Socioeconomic History    Marital status:    Tobacco Use    Smoking status: Current Every Day Smoker     Packs/day: 1.00     Types: Cigarettes    Smokeless tobacco: Never Used   Substance and Sexual Activity    Alcohol use: Yes     Alcohol/week: 1.0 standard drink     Types: 1 Shots of " liquor per week     Comment: once a month    Drug use: Never    Sexual activity: Yes       Lab Results   Component Value Date    WBC 7.8 07/28/2022    HGB 14.3 07/28/2022    HCT 43.3 07/28/2022    MCV 93.9 07/28/2022     07/28/2022          BMP  Lab Results   Component Value Date     07/28/2022    K 4.5 07/28/2022    CHLORIDE 107 07/28/2022    CO2 22 (L) 07/28/2022    GLUCOSE 96 07/28/2022    BUN 17.5 07/28/2022    CREATININE 0.90 07/28/2022    CALCIUM 9.4 07/28/2022    EGFRNONAA >60 07/28/2022        INR  No results for input(s): PT, INR, PROTIME, APTT in the last 72 hours.        Diagnostic Studies:      EKG:  Results for orders placed or performed in visit on 07/28/22   EKG 12-lead    Collection Time: 07/28/22  1:55 PM    Narrative    Test Reason : Z01.818,    Vent. Rate : 072 BPM     Atrial Rate : 072 BPM     P-R Int : 142 ms          QRS Dur : 094 ms      QT Int : 378 ms       P-R-T Axes : 042 -19 036 degrees     QTc Int : 413 ms    Normal sinus rhythm  Low voltage QRS  Borderline Abnormal ECG  No previous ECGs available  Confirmed by Glenroy Mendez MD ([id number]) on 8/2/2022 8:19:57 PM    Referred By: AMIRA LAGUNAS           Confirmed By:Glenroy Mendez MD       .      Pre-op Assessment    I have reviewed the Patient Summary Reports.    I have reviewed the NPO Status.   I have reviewed the Medications.     Review of Systems  Anesthesia Hx:  No problems with previous Anesthesia  Denies Family Hx of Anesthesia complications.    Cardiovascular:  Cardiovascular Normal  No Cardiac Complaints   Pulmonary:  Pulmonary Normal No Pulmonary Complaints   Hepatic/GI:   No Current GERD Sx       Physical Exam  General: Alert and Oriented    Airway:  Mallampati: II   Mouth Opening: Normal  TM Distance: Normal  Tongue: Normal  Neck ROM: Normal ROM    Dental:  Intact    Chest/Lungs:  Clear to auscultation, Normal Respiratory Rate    Heart:  Rate: Normal  Rhythm: Regular Rhythm        Anesthesia Plan  Type of Anesthesia,  risks & benefits discussed:    Anesthesia Type: Gen ETT  Intra-op Monitoring Plan: Standard ASA Monitors  Post Op Pain Control Plan: multimodal analgesia  Induction:  IV and Inhalation  Airway Plan: Direct, Post-Induction  Informed Consent: Informed consent signed with the Patient and all parties understand the risks and agree with anesthesia plan.  All questions answered. Patient consented to blood products? No  ASA Score: 2  Day of Surgery Review of History & Physical: H&P Update referred to the surgeon/provider.  Anesthesia Plan Notes: Discussed Anesthetic Plan w/ Pt/Family. Questions Entertained. Accepted.    Ready For Surgery From Anesthesia Perspective.     .

## 2022-08-03 NOTE — OP NOTE
[facility] Surgical - Periop Services  Surgery Department  Operative Note    SUMMARY     Date of Procedure: 8/3/2022     Procedure: Procedure(s) (LRB):  REPAIR, HERNIA, VENTRAL, LAPAROSCOPIC (N/A)     Surgeon(s) and Role:     * Jeff Dooley MD - Primary    Assisting Surgeon: None    Pre-Operative Diagnosis: * No pre-op diagnosis entered *    Post-Operative Diagnosis: Post-Op Diagnosis Codes:     * Ventral hernia without obstruction or gangrene [K43.9]    Anesthesia: General      Description of Technical Procedures: Patient was taken to the OR.  The abdomen was prepped and draped in the usual sterile fashion after successfel general anesthetic administered.  An optical tipped trocar was used to access the peritoneal cavity.  The abdomen was explored and additional working ports were placed under direct vision.  No visceral injury was noted from placement of trocars. The hernia was noted and measured.  The contents were reduced.  A portion of parietex mesh was placed intraperitoneally to cover the defect with a 3 cm overlap circumferentially.  This was fixed with combination of trans fascial permanent suture and spiral tacks.  The remainder of the exploration revealed no other pathology.  The trocars were removed.  The incisions closed with Vicryl.       Estimated Blood Loss (EBL): * No values recorded between 8/3/2022 11:17 AM and 8/3/2022 11:55 AM *           Implants:   Implant Name Type Inv. Item Serial No.  Lot No. LRB No. Used Action   MESH PARIETEX ROUND 9CM - SNA Mesh MESH PARIETEX ROUND 9CM NA COVIDIEN [id number] N/A 1 Implanted       Specimens:   Specimen (24h ago, onward)            None                  Condition: Good    Disposition: PACU - hemodynamically stable.    Attestation: I was present and scrubbed for the entire procedure.

## 2022-08-04 VITALS
DIASTOLIC BLOOD PRESSURE: 71 MMHG | OXYGEN SATURATION: 98 % | HEIGHT: 69 IN | SYSTOLIC BLOOD PRESSURE: 124 MMHG | HEART RATE: 65 BPM | BODY MASS INDEX: 25.63 KG/M2 | WEIGHT: 173.06 LBS | RESPIRATION RATE: 15 BRPM | TEMPERATURE: 98 F

## 2022-11-28 PROBLEM — L70.9 ACNE: Status: ACTIVE | Noted: 2022-11-28

## 2022-11-28 PROBLEM — N52.9 ERECTILE DYSFUNCTION: Status: ACTIVE | Noted: 2022-11-28

## 2022-11-28 PROBLEM — R94.6 THYROID FUNCTION TEST ABNORMAL: Status: RESOLVED | Noted: 2022-11-28 | Resolved: 2022-11-28

## 2022-11-28 PROBLEM — J43.9 PULMONARY EMPHYSEMA: Status: ACTIVE | Noted: 2022-11-28

## 2022-11-28 PROBLEM — G25.81 RESTLESS LEGS SYNDROME: Status: ACTIVE | Noted: 2022-11-28

## 2022-11-28 PROBLEM — Z72.0 TOBACCO USER: Status: ACTIVE | Noted: 2022-11-28

## 2022-11-28 PROBLEM — G47.00 INSOMNIA: Status: ACTIVE | Noted: 2022-11-28

## 2022-11-28 PROBLEM — R94.6 THYROID FUNCTION TEST ABNORMAL: Status: ACTIVE | Noted: 2022-11-28

## 2022-11-28 PROBLEM — Z00.00 MEDICARE ANNUAL WELLNESS VISIT, SUBSEQUENT: Status: ACTIVE | Noted: 2022-11-28

## 2022-11-28 PROBLEM — K25.9 GASTRIC ULCER: Status: ACTIVE | Noted: 2022-11-28

## 2022-11-28 PROBLEM — E78.5 HYPERLIPIDEMIA: Status: ACTIVE | Noted: 2022-11-28

## 2022-11-28 PROBLEM — K58.9 IRRITABLE BOWEL SYNDROME: Status: ACTIVE | Noted: 2022-11-28

## 2022-11-28 PROBLEM — E29.1 HYPOGONADISM IN MALE: Status: ACTIVE | Noted: 2022-11-28

## 2022-11-28 PROBLEM — J32.9 CHRONIC SINUSITIS: Status: ACTIVE | Noted: 2022-11-28

## 2022-11-28 PROBLEM — N40.0 BENIGN PROSTATIC HYPERPLASIA: Status: ACTIVE | Noted: 2022-11-28

## 2022-11-28 PROBLEM — M19.049 OSTEOARTHRITIS OF HAND: Status: ACTIVE | Noted: 2022-11-28

## 2023-02-27 PROBLEM — Z00.00 MEDICARE ANNUAL WELLNESS VISIT, SUBSEQUENT: Status: RESOLVED | Noted: 2022-11-28 | Resolved: 2023-02-27

## (undated) DEVICE — NDL GRANEE OPEN LOOP GRASPER

## (undated) DEVICE — GLOVE SURG BIOGEL LATEX SZ 7.5

## (undated) DEVICE — DRESSING N ADH OIL EMUL 3X3

## (undated) DEVICE — GAUZE SPONGE 4X4 12PLY

## (undated) DEVICE — GLOVE PROTEXIS PI SYN SURG 6.0

## (undated) DEVICE — BINDER ABDOMINAL UNIV XLN 10IN

## (undated) DEVICE — CANNULA ENDOPATH XCEL 5X100MM

## (undated) DEVICE — SUT VICRYL PLUS 4-0 FS-2 27IN

## (undated) DEVICE — Device

## (undated) DEVICE — SKINMARKER & RULER REGULAR X-F

## (undated) DEVICE — SUT 2/0 30IN PROLENE MONO

## (undated) DEVICE — TROCAR ENDOPATH XCEL 5X100MM

## (undated) DEVICE — PAD ABD 8X10 STERILE

## (undated) DEVICE — BLADE SURG STAINLESS STEEL #11

## (undated) DEVICE — TROCAR ENDOPATH XCEL 11MM 10CM

## (undated) DEVICE — SOL IRRI STRL WATER 1000ML

## (undated) DEVICE — BENZOIN TINCTURE CAPSULET

## (undated) DEVICE — STAPLER INT HERNIA PROTACK 5MM

## (undated) DEVICE — NDL SPINAL 20GX3.5 HUB

## (undated) DEVICE — ELECTRODE REM POLYHESIVE II

## (undated) DEVICE — SPONGE KERLIX ANTIMIC 6X6.75IN